# Patient Record
(demographics unavailable — no encounter records)

---

## 2024-12-16 NOTE — HISTORY OF PRESENT ILLNESS
[FreeTextEntry1] : This patient is seen for an office visit with respect to his longstanding multiple sclerosis.  He is a 44-year-old male last seen by me on 3/28/2022.  At that time he did report taking Gilenya 0.5 mg daily without side effects for several years.  He has longstanding diabetes treated with insulin.  He is status post bilateral vitrectomies for hemorrhage associated with diabetes.  He denies any further vertigo.  His last documented exacerbation was 5/21/2019 when he presented with a right internuclear ophthalmoplegia which did resolve after 3 days of Solu-Medrol.  He denies any recurrence.  He denies any symptomatology that would suggest progression of MS or exacerbation.  He does have symptoms of a chronic diabetic peripheral neuropathy affecting both distal lower extremities including legs and feet with decreased sensation and dysesthesia.  The patient did have MRI scanning of the brain cervical thoracic spine on 4/16/2022 and 4/17/2022.  With and without contrast performed.  There was atrophy and chronic changes consistent with multiple sclerosis however there was no definite evidence of enhancement seen to indicate activity.

## 2024-12-16 NOTE — REASON FOR VISIT
[Follow-Up: _____] : a [unfilled] follow-up visit [FreeTextEntry1] : Multiple sclerosis essentially asymptomatic in this regard

## 2024-12-16 NOTE — ASSESSMENT
[FreeTextEntry1] : Impression: This 44-year-old male patient with long history of multiple sclerosis with last documented exacerbation being May 2019 when he presented with right internuclear ophthalmoplegia which did resolve after 3 days of IV Solu-Medrol.  He was seen on 3/28/2022 for vertigo and imaging at that time of brain cervical thoracic spine with and without contrast in April 2022 did not reveal evidence of enhancement to suggest activity.  There were chronic changes with MS plaque and atrophy.  At this time the patient is asymptomatic other than symptoms of a diabetic peripheral neuropathy which is evident on his neurological exam.  He has visual complaints related to his diabetes as well status post vitrectomy.  Recommendations: Repeat MRI scanning of brain cervical thoracic spine with and without contrast to compare to prior studies.  Office follow-up in 1 year or as needed.  Continue Gilenya 0.5 mg once daily.  Routine laboratory tests while receiving the medication including CBC comprehensive metabolic profile thyroid and B12.  Office follow-up in 1 year or as needed.

## 2024-12-16 NOTE — PHYSICAL EXAM
[FreeTextEntry1] : Head:  Normocephalic Neck: Supple nontender negative Lhermitte sign.  Mental Status:  Alert Oriented X3 Speech normal and no aphasia or dysarthria.  Cranial Nerves:  PERRL, fundi not visualized.  Visual Fields full  EOMI no diplopia no ptosis no nystagmus, V through XII intact.  Motor:  No drift, normal strength tone and coordination and no focal atrophy. No abnormal movements. No dysmetria.  Normal rapid alternating movements.   DTRs: Symmetric absent in the lowers plantars downgoing.  No clonus.  Sensory: Decreased pin touch vibration distal lower extremities consistent with diabetic neuropathy.  Gait:  Normal including tandem walking heel toe walking and Rhomberg.

## 2025-02-20 NOTE — ASSESSMENT
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 Cr 0.9  - Continue trimix. Refills sent - F/u 1 year

## 2025-02-20 NOTE — HISTORY OF PRESENT ILLNESS
[FreeTextEntry1] : 44 year old male with ED   Last seen by Dr. 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 Cr 0.9  Doing well Satisfied with trimix Uses 12u. Lasts 20-45 min No priapism episodes No scar tissue